# Patient Record
Sex: MALE | Race: WHITE | NOT HISPANIC OR LATINO | URBAN - METROPOLITAN AREA
[De-identification: names, ages, dates, MRNs, and addresses within clinical notes are randomized per-mention and may not be internally consistent; named-entity substitution may affect disease eponyms.]

---

## 2023-04-06 ENCOUNTER — APPOINTMENT (OUTPATIENT)
Dept: URBAN - METROPOLITAN AREA SURGERY 19 | Age: 73
Setting detail: DERMATOLOGY
End: 2023-04-07

## 2023-04-06 VITALS — DIASTOLIC BLOOD PRESSURE: 87 MMHG | HEART RATE: 65 BPM | TEMPERATURE: 98 F | SYSTOLIC BLOOD PRESSURE: 159 MMHG

## 2023-04-06 VITALS — SYSTOLIC BLOOD PRESSURE: 155 MMHG | HEART RATE: 74 BPM | DIASTOLIC BLOOD PRESSURE: 75 MMHG | TEMPERATURE: 98.4 F

## 2023-04-06 DIAGNOSIS — I87.2 VENOUS INSUFFICIENCY (CHRONIC) (PERIPHERAL): ICD-10-CM

## 2023-04-06 PROBLEM — C44.712 BASAL CELL CARCINOMA OF SKIN OF RIGHT LOWER LIMB, INCLUDING HIP: Status: ACTIVE | Noted: 2023-04-06

## 2023-04-06 PROCEDURE — OTHER MOHS SURGERY: OTHER

## 2023-04-06 PROCEDURE — OTHER PRESCRIPTION: OTHER

## 2023-04-06 PROCEDURE — 29580 STRAPPING UNNA BOOT: CPT | Mod: RT

## 2023-04-06 PROCEDURE — OTHER UNNA BOOT APPLICATION: OTHER

## 2023-04-06 PROCEDURE — 17313 MOHS 1 STAGE T/A/L: CPT

## 2023-04-06 PROCEDURE — 17314 MOHS ADDL STAGE T/A/L: CPT

## 2023-04-06 RX ORDER — DOXYCYCLINE HYCLATE 100 MG/1
CAPSULE, GELATIN COATED ORAL
Qty: 14 | Refills: 0 | COMMUNITY
Start: 2023-04-06

## 2023-04-06 NOTE — PROCEDURE: MOHS SURGERY
Full Thickness Lip Wedge Repair (Flap) Text: In order to maintain form and function of the lip, and to avoid distortion of the free margin, a lip advancement flap was planned. After prep and local anesthesia, Burow???s triangles were excised superiorly to the nasal sill and inferiorly around the lip to the labial mucosa.  Two flaps were elevated by undermining the skin laterally in both directions,  the skin and mucosa from the orbicularis muscle.  Any redundant orbicularis oris muscle was removed and complimentary edges of remaining muscle reapposed with a horizontal mattress stitch.  After hemostasis was obtained, the flaps were advanced and closed in a layered fashion.

## 2023-04-06 NOTE — PROCEDURE: MOHS SURGERY
Mercedes Flap Text: Because of the full-thickness nature of the defect and tightness of surrounding skin, a triple-advancement flap was planned.  After prep and local anesthesia, three Burow???s triangles were excised adjacent to the defect.  The wound edges were widely undermined to raise three flaps in the deep subcutaneous plane.  Hemostasis was achieved.  The flaps were then advanced  and carried over into the defect and sutured into place.

## 2023-04-06 NOTE — PROCEDURE: MOHS SURGERY
Chonodrocutaneous Helical Advancement Flap Text: Because of the tightness of the surrounding skin, the full-thickness nature of the defect with exposed cartilage, and to avoid deformity, a helical rim advancement flap was planed.  After prep and anesthesia, an incision was made in the anterior portion of the ear through the skin and the cartilage to the posterior perichondrium both superiorly and inferiorly within the scapha of the ear.  Inferiorly, this extended to the lobule where a Burow???s triangle was excised anteriorly.  The chondrocutaneous flaps were then  from the ear posteriorly at the level of the perichondrium to the postauricular sulcus.  A small rim of cartilage was also excised around the contour of the ear and after hemostasis obtained, the chondrocutaneous flaps were advanced and closed in a layered fashion

## 2023-04-06 NOTE — PROCEDURE: MOHS SURGERY
Dorsal Nasal Flap Text: Because of the full-thickness nature of the defect and to avoid deformity of free margin of the ala, and after full discussion of the spectrum of repair options, a dorsal nasal rotation flap was planned.  After prep and anesthesia, a Burow???s triangle was excised from the lateral portion superiorly on the nasal sidewall towards the inner canthus and an incision extended from the inferior margin of the wound across the nose and sidewall, then extended superiorly along the nasofacial sulcus and medial cheek through the inner canthus to the glabella where a back cut was made to the contralateral inner canthus.  The flap was elevated by skeletonizing the nasal root, dorsum, and sidewalls.  After hemostasis obtained, the flap was rotated into place, trimmed to fit the defect, and sutured in a layered fashion.

## 2023-04-06 NOTE — PROCEDURE: MOHS SURGERY
Burow's Graft Text: Because of the full-thickness nature of the wound and surrounding skin tension, a complex linear repair with Burow's graft was planned. After prep and anesthesia, one or two Burow???s triangles were excised adjacent to the defect and placed into sterile saline.  Two flaps were raised bilaterally by undermining widely in the subcutaneous plane. Hemostasis was obtained and the flaps were advanced into the defect with care to avoid distortion, and the wound edges were closed in a layered fashion, leaving a smaller defect.  A Burow???s triangle was defatted and trimmed to fit this remaining defect, and sutured into place circumferentially.

## 2023-04-06 NOTE — PROCEDURE: MOHS SURGERY
V-Y Plasty Text: Because of the full-thickness nature of the wound and to preserve nearby structures, a V-toY Advancement flap was planned. After prep and local anesthesia, a Burow???s triangle was excised adjacent to the defect.  Opposite from this, two smaller Burow???s triangles were excised.  Three flaps were created by undermining in the deep subcutaneous plane. After hemostasis, the flaps were advanced and closed in a layered fashion.

## 2023-04-06 NOTE — PROCEDURE: MOHS SURGERY
O-L Flap Text: Because of the full-thickness nature of the defect, and to preserve nearby structures and landmarks, a Burow???s advancement flap (L-plasty) was planned. After prep and anesthesia, a Burow's triangle was excised adjacent to the defect.  Perpendicular to this, a line was incised and crescentic Burow???s triangle excised.  The flap was elevated by undermining in the subcutaneous plane. Hemostasis was obtained.  The flap was advanced into the defect with care to avoid distortion and was sutured into place in a layered fashion

## 2023-04-06 NOTE — PROCEDURE: MOHS SURGERY
Crescentic Advancement Flap Text: In order to maintain form and function of the airway, a crescentic advancement flap was planned.  After prep and local anesthesia, a Burow???s triangle was excised superior to the defect.  A tangential and curvilinear incision was made onto the medial cheek.  Here, a crescentic Burow???s triangle was excised.  The laterally-based flap was then raised by dissection in the deep subcutaneous plane and the remainder of the wound edges were also undermined.  After hemostasis, the flap was advanced into the defect with a periosteal suture at the base of the flap and the lateral nasal bone.  All wound edges were closed in a layered fashion.

## 2023-04-06 NOTE — PROCEDURE: MOHS SURGERY
Staged Advancement Flap Text: Because of the full-thickness nature of the wound and in order to displace lines around important structures, a staged advancement flap was planned. After prep and local anesthesia, a Burow???s triangle was excised adjacent to the defect.  The other Burow's triangle was displaced to hide incisions within skin relaxation lines. Two flaps were created by undermining in the deep subcutaneous plane. After hemostasis, the flaps were advanced and carried over and closed in a layered fashion. This is a staged repair and the patient will return for additional surgery.

## 2023-04-06 NOTE — PROCEDURE: MOHS SURGERY
Advancement Flap (Single) Text: Because of the full-thickness nature of the wound and in order to displace lines around important structures, a advancement flap was planned. After prep and local anesthesia, a Burow???s triangle was excised adjacent to the defect.  The other Burow's triangle was displaced to hide incisions within skin relaxation lines. Two flaps were created by undermining in the deep subcutaneous plane. After hemostasis, the flaps were advanced, carried over and closed in a layered fashion.

## 2023-04-06 NOTE — PROCEDURE: MOHS SURGERY
Helical Rim Advancement Flap Text: Because of the tightness of the surrounding skin, the full-thickness nature of the defect with exposed cartilage, and to avoid deformity, a helical rim advancement flap was planned.  After prep and anesthesia, any protuberant cartilage or cartilage in the way of tissue movement and approximation was excised.  Then, an incision was made in the anterior portion of the ear through the skin to the posterior ear at the level of the perichondrium both superiorly and inferiorly.  Inferiorly, this extended to the lobule where a Burow???s triangle was excised anteriorly.  The flaps were then  from the ear posteriorly at the level of the perichondrium to the postauricular sulcus.  After hemostasis obtained, the flaps were advanced and closed in a layered fashion

## 2023-04-06 NOTE — PROCEDURE: MOHS SURGERY
Paramedian Forehead Flap Text: Because of the size and full-thickness nature of the defect, and to maintain form and function of the nose, a forehead flap with bilateral forehead advancement flap closure of the donor site was planned.  After prep and anesthesia, excisional preparation of the recipient site was performed by outlining the cosmetic unit of the nasal tip.  Hemostasis was obtained.  A template was then made of the defect and transferred with the appropriate length to the upper forehead.  The forehead flap was incised and elevated based on the supratrochlear neurovascular bundle.  This was carefully dissected over the orbital rim to the nasion.  It was distally thinned and transferred to the nasal tip.  This was sutured in a layered fashion.  To close the donor site defect on the forehead, a large Burow???s triangle was excised superiorly and posteriorly into the scalp, and two large flaps were elevated by undermining the entire anterior scalp and forehead to the temples bilaterally, and over the supraorbital rim inferiorly.  After hemostasis was obtained, these flaps were advanced and closed in a layered fashion.

## 2023-04-06 NOTE — PROCEDURE: MOHS SURGERY
Cheiloplasty (Less Than 50%) Text: In order to maintain form and function of the lip, and to avoid distortion of the free margin, a lip advancement flap was planned. After rep and local anesthesia, Burow???s triangles were excised superiorly to the nasal sill and inferiorly around the lip to the labial mucosa.  Two flaps were elevated by undermining the skin laterally in both directions,  the skin and mucosa from the orbicularis muscle.  Any redundant orbicularis oris muscle was removed and complimentary edges of remaining muscle reapposed with a horizontal mattress stitch.  After hemostasis was obtained, the flaps were advanced and closed in a layered fashion.

## 2023-04-06 NOTE — PROCEDURE: UNNA BOOT APPLICATION
Location Applied: the right leg
Detail Level: Zone
Removal Of Previous Unna Boot (Yes) Text: The previous Unna Boot was removed and then the site was cleaned in preparation for another Unna Boot application.
Indication: post-operative immobilization
Kerlix Units: 1
Bill For Unna Boot Supplies?: No
Medication Occluded Under Unnaboot: Aquaphor
After Unna Boot Application Text: Coban was used to wrap the outside of Unna Boot and we ensured the Unna Boot wasn't too tight prior to the patient leaving the office.
Removal Of Previous Unna Boot (No) Text: The site was cleaned in preparation for an Unna Boot application.
Additional Instructions: Return in one week for Unna boot change or remove at home in one week

## 2023-04-06 NOTE — PROCEDURE: MOHS SURGERY
O-T Plasty Text: Because of the full-thickness nature of the defect and location adjacent to important structure(s), a bilateral advancement flap (O to T) was planned. After prep and anesthesia, a Burow's triangle was excised adjacent to the defect.  Incisions were extended from the opposite side of the wound, and smaller Burow???s triangles at the end of each incision.  Two flaps were created by undermining in the subcutaneous plane. After hemostasis was obtained, the flaps were advanced and sutured in a layered fashion.

## 2023-04-06 NOTE — PROCEDURE: MOHS SURGERY
Burow's Advancement Flap Text: Because of the full-thickness nature of the wound and in order to displace lines around important structures, a Burow???s advancement flap was planned. After prep and local anesthesia, a Burow???s triangle was excised adjacent to the defect.  The other Burow's triangle was displaced to hide incisions within skin relaxation lines. Two flaps were created by undermining in the deep subcutaneous plane. After hemostasis, the flaps were advanced and closed in a layered fashion.

## 2023-04-06 NOTE — PROCEDURE: MOHS SURGERY
Zygomaticofacial Flap Text: Because of full-thickness of the defect and to avoid distortion of the lower eyelid, a full cheek rotation flap was planned. After prep and anesthesia, a large Burow???s triangle was excised inferiorly.  An incision was made from the superior portion of the wound over the orbital rim, curving upward onto the temple, then downward along the preauricular crease and below the ear lobule where another Burow???s triangle was excised.  The full cheek flap was elevated and the wound edges were undermined in the subcutaneous plane. After hemostasis, the flap was rotated into place, trimmed at the tip, suspended with a periosteal stitch from the orbital rim, and sutured in a a layered fashion.

## 2023-04-06 NOTE — PROCEDURE: MOHS SURGERY
Bilateral Helical Rim Advancement Flap Text: Because of the tightness of the surrounding skin, the full-thickness nature of the defect with exposed cartilage, and to avoid deformity, a helical rim advancement flap was planed.  After prep and anesthesia, an incision was made in the anterior portion of the ear through the skin and the cartilage to the posterior perichondrium both superiorly and inferiorly within the scapha of the ear.  Inferiorly, this extended to the lobule where a Burow???s triangle was excised anteriorly.  The chondrocutaneous flaps were then  from the ear posteriorly at the level of the perichondrium to the postauricular sulcus.  A small rim of cartilage was also excised around the contour of the ear both superiorly and inferiorly, and after hemostasis obtained, the chondrocutaneous flaps were advanced and closed in a layered fashion

## 2023-04-19 ENCOUNTER — APPOINTMENT (OUTPATIENT)
Dept: URBAN - METROPOLITAN AREA SURGERY 20 | Age: 73
Setting detail: DERMATOLOGY
End: 2023-04-19

## 2023-04-19 DIAGNOSIS — S31000A OPEN WOUND(S) (MULTIPLE) OF UNSPECIFIED SITE(S), WITHOUT MENTION OF COMPLICATION: ICD-10-CM

## 2023-04-19 PROBLEM — Z48.01 ENCOUNTER FOR CHANGE OR REMOVAL OF SURGICAL WOUND DRESSING: Status: ACTIVE | Noted: 2023-04-19

## 2023-04-19 PROBLEM — S81.801A UNSPECIFIED OPEN WOUND, RIGHT LOWER LEG, INITIAL ENCOUNTER: Status: ACTIVE | Noted: 2023-04-19

## 2023-04-19 PROCEDURE — OTHER UNNA BOOT APPLICATION: OTHER

## 2023-04-19 PROCEDURE — OTHER MIPS QUALITY: OTHER

## 2023-04-19 PROCEDURE — OTHER POST-OP WOUND EVALUATION: OTHER

## 2023-04-19 PROCEDURE — 29580 STRAPPING UNNA BOOT: CPT | Mod: RT

## 2023-04-19 ASSESSMENT — LOCATION ZONE DERM: LOCATION ZONE: LEG

## 2023-04-19 ASSESSMENT — LOCATION SIMPLE DESCRIPTION DERM: LOCATION SIMPLE: RIGHT PRETIBIAL REGION

## 2023-04-19 ASSESSMENT — LOCATION DETAILED DESCRIPTION DERM: LOCATION DETAILED: RIGHT PROXIMAL PRETIBIAL REGION

## 2023-04-19 NOTE — PROCEDURE: UNNA BOOT APPLICATION
Kerlix Units: 1
Removal Of Previous Unna Boot (Yes) Text: The previous Unna Boot was removed and then the site was cleaned in preparation for another Unna Boot application.
Medication Occluded Under Unnaboot: Aquaphor
Was A Previous Unna Boot Removed?: Yes
Location Applied: the right leg
Removal Of Previous Unna Boot (No) Text: The site was cleaned in preparation for an Unna Boot application.
Indication: post-operative immobilization
Detail Level: Zone
Additional Instructions: Return in two weeks for Unna boot change.
After Unna Boot Application Text: Coban was used to wrap the outside of Unna Boot and we ensured the Unna Boot wasn't too tight prior to the patient leaving the office.
Bill For Unna Boot Supplies?: No

## 2023-04-19 NOTE — PROCEDURE: POST-OP WOUND EVALUATION
Wound Diameter In Cm(Optional): 0
Wound Discharge?: minimal discharge
Wound Drainage?: serous drainage
Detail Level: Detailed
Patient To Follow-Up With?: our clinic
Wound Evaluated By (Optional): Jarrod Gillespie M.D.
Body Location Override (Optional): right anterior lower leg
Follow Up Provider (Optional): Jarrod Gillespie M.D.
Wound Edema?: mild
Any New Or Residual Neoplasm?: No
Follow Up Time Frame (Optional): months
Follow Up Units (Optional): 1
Wound Color?: pink
Wound Granulation?: early
Wound Crusting?: crusted

## 2023-05-10 ENCOUNTER — APPOINTMENT (OUTPATIENT)
Dept: URBAN - METROPOLITAN AREA SURGERY 20 | Age: 73
Setting detail: DERMATOLOGY
End: 2023-05-10

## 2023-05-10 DIAGNOSIS — S31000A OPEN WOUND(S) (MULTIPLE) OF UNSPECIFIED SITE(S), WITHOUT MENTION OF COMPLICATION: ICD-10-CM

## 2023-05-10 PROBLEM — S81.801A UNSPECIFIED OPEN WOUND, RIGHT LOWER LEG, INITIAL ENCOUNTER: Status: ACTIVE | Noted: 2023-05-10

## 2023-05-10 PROCEDURE — OTHER POST-OP WOUND EVALUATION: OTHER

## 2023-05-10 PROCEDURE — 99212 OFFICE O/P EST SF 10 MIN: CPT

## 2023-05-10 ASSESSMENT — LOCATION DETAILED DESCRIPTION DERM: LOCATION DETAILED: RIGHT PROXIMAL PRETIBIAL REGION

## 2023-05-10 ASSESSMENT — LOCATION ZONE DERM: LOCATION ZONE: LEG

## 2023-05-10 ASSESSMENT — LOCATION SIMPLE DESCRIPTION DERM: LOCATION SIMPLE: RIGHT PRETIBIAL REGION

## 2023-05-10 NOTE — PROCEDURE: POST-OP WOUND EVALUATION
Detail Level: Detailed
Wound Crusting?: clean
Wound Drainage?: serous drainage
Follow Up Units (Optional): 1
Wound Granulation?: early
Wound Edema?: mild
Wound Diameter In Cm(Optional): 0
Wound Color?: pink
Wound Evaluated By (Optional): Jarrod Gillespie M.D.
Patient To Follow-Up With?: our clinic
Any New Or Residual Neoplasm?: No
Wound Discharge?: minimal discharge
Follow Up Provider (Optional): Jarrod Gillespie M.D.
Follow Up Time Frame (Optional): months

## 2023-06-12 ENCOUNTER — APPOINTMENT (OUTPATIENT)
Dept: URBAN - METROPOLITAN AREA SURGERY 20 | Age: 73
Setting detail: DERMATOLOGY
End: 2023-06-12

## 2023-06-12 DIAGNOSIS — S31000A OPEN WOUND(S) (MULTIPLE) OF UNSPECIFIED SITE(S), WITHOUT MENTION OF COMPLICATION: ICD-10-CM

## 2023-06-12 PROBLEM — S81.801A UNSPECIFIED OPEN WOUND, RIGHT LOWER LEG, INITIAL ENCOUNTER: Status: ACTIVE | Noted: 2023-06-12

## 2023-06-12 PROCEDURE — OTHER POST-OP WOUND EVALUATION: OTHER

## 2023-06-12 PROCEDURE — 99212 OFFICE O/P EST SF 10 MIN: CPT

## 2023-06-12 ASSESSMENT — LOCATION ZONE DERM: LOCATION ZONE: LEG

## 2023-06-12 ASSESSMENT — LOCATION SIMPLE DESCRIPTION DERM: LOCATION SIMPLE: RIGHT PRETIBIAL REGION

## 2023-06-12 ASSESSMENT — LOCATION DETAILED DESCRIPTION DERM: LOCATION DETAILED: RIGHT DISTAL PRETIBIAL REGION

## 2023-06-12 NOTE — PROCEDURE: POST-OP WOUND EVALUATION
Wound Color?: pink
Wound Granulation?: early
Follow Up Provider (Optional): Jarrod Gillespie M.D.
Body Location Override (Optional): Right lower leg
Wound Evaluated By (Optional): Jarrdo Gillespie M.D.
Patient To Follow-Up With?: our clinic
Wound Diameter In Cm(Optional): 0
Follow Up Units (Optional): 1
Wound Discharge?: minimal discharge
Detail Level: Detailed
Wound Drainage?: serous drainage
Any New Or Residual Neoplasm?: No
Follow Up Time Frame (Optional): months
Wound Edema?: mild

## 2023-07-12 ENCOUNTER — APPOINTMENT (OUTPATIENT)
Dept: URBAN - METROPOLITAN AREA SURGERY 20 | Age: 73
Setting detail: DERMATOLOGY
End: 2023-07-12

## 2023-07-12 DIAGNOSIS — S31000A OPEN WOUND(S) (MULTIPLE) OF UNSPECIFIED SITE(S), WITHOUT MENTION OF COMPLICATION: ICD-10-CM

## 2023-07-12 PROBLEM — S81.801A UNSPECIFIED OPEN WOUND, RIGHT LOWER LEG, INITIAL ENCOUNTER: Status: ACTIVE | Noted: 2023-07-12

## 2023-07-12 PROCEDURE — 99212 OFFICE O/P EST SF 10 MIN: CPT

## 2023-07-12 PROCEDURE — OTHER POST-OP WOUND EVALUATION: OTHER

## 2023-07-12 ASSESSMENT — LOCATION SIMPLE DESCRIPTION DERM: LOCATION SIMPLE: RIGHT PRETIBIAL REGION

## 2023-07-12 ASSESSMENT — LOCATION DETAILED DESCRIPTION DERM: LOCATION DETAILED: RIGHT PROXIMAL PRETIBIAL REGION

## 2023-07-12 ASSESSMENT — LOCATION ZONE DERM: LOCATION ZONE: LEG

## 2023-07-12 NOTE — PROCEDURE: POST-OP WOUND EVALUATION
Patient To Follow-Up With?: our clinic
Wound Crusting?: clean
Follow Up Time Frame (Optional): months
Any New Or Residual Neoplasm?: No
Wound Diameter In Cm(Optional): 0
Wound Granulation?: early
Wound Evaluated By (Optional): Jarrod Gillespie M.D.
Wound Discharge?: minimal discharge
Follow Up Provider (Optional): Jarrod Gillespie M.D.
Lymphadenopathy?: present
Wound Edema?: moderate
Wound Color?: red
Detail Level: Detailed
Wound Drainage?: serous drainage
Follow Up Units (Optional): 1

## 2023-08-15 ENCOUNTER — APPOINTMENT (OUTPATIENT)
Dept: URBAN - METROPOLITAN AREA SURGERY 20 | Age: 73
Setting detail: DERMATOLOGY
End: 2023-08-15

## 2023-08-15 DIAGNOSIS — S31000A OPEN WOUND(S) (MULTIPLE) OF UNSPECIFIED SITE(S), WITHOUT MENTION OF COMPLICATION: ICD-10-CM

## 2023-08-15 PROBLEM — S81.801A UNSPECIFIED OPEN WOUND, RIGHT LOWER LEG, INITIAL ENCOUNTER: Status: ACTIVE | Noted: 2023-08-15

## 2023-08-15 PROCEDURE — 99212 OFFICE O/P EST SF 10 MIN: CPT

## 2023-08-15 PROCEDURE — OTHER POST-OP WOUND EVALUATION: OTHER

## 2023-08-15 ASSESSMENT — LOCATION DETAILED DESCRIPTION DERM: LOCATION DETAILED: RIGHT DISTAL PRETIBIAL REGION

## 2023-08-15 ASSESSMENT — LOCATION ZONE DERM: LOCATION ZONE: LEG

## 2023-08-15 ASSESSMENT — LOCATION SIMPLE DESCRIPTION DERM: LOCATION SIMPLE: RIGHT PRETIBIAL REGION

## 2023-08-15 NOTE — PROCEDURE: POST-OP WOUND EVALUATION
Wound Edema?: mild
Follow Up Time Frame (Optional): months
Body Location Override (Optional): right proximal pretibial area
Wound Diameter In Cm(Optional): 0
Follow Up Provider (Optional): Jarrod Gillespie M.D.
Wound Evaluated By (Optional): Jarrod Gillespie M.D.
Patient To Follow-Up With?: our clinic
Wound Color?: pink
Any New Or Residual Neoplasm?: No
Wound Discharge?: minimal discharge
Wound Granulation?: early
Wound Drainage?: serous drainage
Follow Up Units (Optional): 1
Wound Crusting?: crusted
Detail Level: Detailed

## 2023-09-12 ENCOUNTER — APPOINTMENT (OUTPATIENT)
Dept: URBAN - METROPOLITAN AREA SURGERY 20 | Age: 73
Setting detail: DERMATOLOGY
End: 2023-09-12

## 2023-09-12 DIAGNOSIS — S31000A OPEN WOUND(S) (MULTIPLE) OF UNSPECIFIED SITE(S), WITHOUT MENTION OF COMPLICATION: ICD-10-CM

## 2023-09-12 PROBLEM — S81.801A UNSPECIFIED OPEN WOUND, RIGHT LOWER LEG, INITIAL ENCOUNTER: Status: ACTIVE | Noted: 2023-09-12

## 2023-09-12 PROCEDURE — OTHER POST-OP WOUND EVALUATION: OTHER

## 2023-09-12 PROCEDURE — 99212 OFFICE O/P EST SF 10 MIN: CPT

## 2023-09-12 ASSESSMENT — LOCATION DETAILED DESCRIPTION DERM: LOCATION DETAILED: RIGHT PROXIMAL PRETIBIAL REGION

## 2023-09-12 ASSESSMENT — LOCATION SIMPLE DESCRIPTION DERM: LOCATION SIMPLE: RIGHT PRETIBIAL REGION

## 2023-09-12 ASSESSMENT — LOCATION ZONE DERM: LOCATION ZONE: LEG

## 2023-09-12 NOTE — PROCEDURE: POST-OP WOUND EVALUATION
Wound Drainage?: serous drainage
Any New Or Residual Neoplasm?: No
Wound Evaluated By (Optional): Jarrod Gillespie M.D.
Wound Diameter In Cm(Optional): 0
Wound Granulation?: early
Detail Level: Detailed
Wound Color?: pink
Wound Discharge?: minimal discharge
Wound Edema?: mild
Follow Up Time Frame (Optional): months
Wound Crusting?: crusted